# Patient Record
Sex: FEMALE | Race: AMERICAN INDIAN OR ALASKA NATIVE | NOT HISPANIC OR LATINO
[De-identification: names, ages, dates, MRNs, and addresses within clinical notes are randomized per-mention and may not be internally consistent; named-entity substitution may affect disease eponyms.]

---

## 2018-11-30 PROBLEM — Z00.00 ENCOUNTER FOR PREVENTIVE HEALTH EXAMINATION: Status: ACTIVE | Noted: 2018-11-30

## 2019-01-17 ENCOUNTER — APPOINTMENT (OUTPATIENT)
Dept: NEUROLOGY | Facility: CLINIC | Age: 49
End: 2019-01-17
Payer: COMMERCIAL

## 2019-01-17 VITALS
OXYGEN SATURATION: 97 % | TEMPERATURE: 98.2 F | BODY MASS INDEX: 25.52 KG/M2 | HEIGHT: 60 IN | WEIGHT: 130 LBS | HEART RATE: 76 BPM | SYSTOLIC BLOOD PRESSURE: 114 MMHG | DIASTOLIC BLOOD PRESSURE: 72 MMHG

## 2019-01-17 DIAGNOSIS — Z78.9 OTHER SPECIFIED HEALTH STATUS: ICD-10-CM

## 2019-01-17 PROCEDURE — 99244 OFF/OP CNSLTJ NEW/EST MOD 40: CPT

## 2019-01-17 RX ORDER — MECLIZINE HYDROCHLORIDE 25 MG/1
25 TABLET ORAL
Qty: 180 | Refills: 5 | Status: ACTIVE | COMMUNITY
Start: 2019-01-17 | End: 1900-01-01

## 2019-01-17 RX ORDER — IBUPROFEN 600 MG/1
600 TABLET, FILM COATED ORAL
Refills: 0 | Status: ACTIVE | COMMUNITY

## 2019-01-17 NOTE — HISTORY OF PRESENT ILLNESS
[FreeTextEntry1] : The patient has history of migraines since her 20s and is here for evaluation of headaches as well as vertigo. The patient has right-sided as well as posterior cervical pain described as pressure as well as throbbing, associated with photo and phonophobia as well as nausea and vomiting. The headaches occur one to twice per week, increased frequency since prior. There is no unilateral weakness or sensory loss with headaches. The patient's menses is regular.  The patient has not been preventative medications. \par \par Additionally, the patient has history of motion sickness and in his experiencing episodes of vertigo. The vertigo is brought up by spinning while dancing. This is associated dizziness. The patient has occasional double vision, horizontal, she also has blurry vision. There is no difficulty with speaking or swallowing. No balance problems.  No unilateral weakness or sensory loss.  No hearing loss or tinnitus.  The patient has history of frequent infections.\par Weakness or sensory loss.

## 2019-01-17 NOTE — PHYSICAL EXAM
[General Appearance - Alert] : alert [General Appearance - In No Acute Distress] : in no acute distress [Person] : oriented to person [Place] : oriented to place [Time] : oriented to time [Registration Intact] : recent registration memory intact [Concentration Intact] : normal concentrating ability [Visual Intact] : visual attention was ~T not ~L decreased [Naming Objects] : no difficulty naming common objects [Repeating Phrases] : no difficulty repeating a phrase [Fluency] : fluency intact [Comprehension] : comprehension intact [Vocabulary] : adequate range of vocabulary [Cranial Nerves Optic (II)] : visual acuity intact bilaterally,  visual fields full to confrontation, pupils equal round and reactive to light [Cranial Nerves Oculomotor (III)] : extraocular motion intact [Cranial Nerves Trigeminal (V)] : facial sensation intact symmetrically [Cranial Nerves Facial (VII)] : face symmetrical [Cranial Nerves Vestibulocochlear (VIII)] : hearing was intact bilaterally [Cranial Nerves Glossopharyngeal (IX)] : tongue and palate midline [Cranial Nerves Accessory (XI - Cranial And Spinal)] : head turning and shoulder shrug symmetric [Cranial Nerves Hypoglossal (XII)] : there was no tongue deviation with protrusion [Motor Tone] : muscle tone was normal in all four extremities [Motor Strength] : muscle strength was normal in all four extremities [Involuntary Movements] : no involuntary movements were seen [Sensation Tactile Decrease] : light touch was intact [Abnormal Walk] : normal gait [Balance] : balance was intact [1+] : Ankle jerk left 1+ [Full Pulse] : the pedal pulses are present [Coordination - Dysmetria Impaired Finger-to-Nose Bilateral] : not present [Coordination - Dysmetria Impaired Heel-to-Shin Bilateral] : not present [Plantar Reflex Right Only] : normal on the right [Plantar Reflex Left Only] : normal on the left [FreeTextEntry5] : Fundi sharp

## 2019-01-17 NOTE — CONSULT LETTER
[Dear  ___] : Dear  [unfilled], [Consult Letter:] : I had the pleasure of evaluating your patient, [unfilled]. [Please see my note below.] : Please see my note below. [Consult Closing:] : Thank you very much for allowing me to participate in the care of this patient.  If you have any questions, please do not hesitate to contact me. [Sincerely,] : Sincerely, [FreeTextEntry3] : Autumn Delarosa MD, MPH\par

## 2019-01-17 NOTE — REVIEW OF SYSTEMS
[As Noted in HPI] : as noted in HPI [Eyesight Problems] : eyesight problems [Vomiting] : vomiting [Fever] : no fever [Depression] : no depression [Loss Of Hearing] : no hearing loss [Chest Pain] : no chest pain [Shortness Of Breath] : no shortness of breath [Incontinence] : no incontinence [Joint Pain] : no joint pain [Itching] : no itching [Muscle Weakness] : no muscle weakness [Easy Bleeding] : no tendency for easy bleeding

## 2019-01-17 NOTE — ASSESSMENT
[FreeTextEntry1] : Migraines \par Will obtain MRI of the brain and EKG. Will consider starting a TAC for headache prevention at next visit.\par \par Vertigo, central vs. peripheral \par MRI brain r/o stroke or mass\par meclazine prn\par ENT referal

## 2019-01-26 ENCOUNTER — OUTPATIENT (OUTPATIENT)
Dept: OUTPATIENT SERVICES | Facility: HOSPITAL | Age: 49
LOS: 1 days | End: 2019-01-26
Payer: COMMERCIAL

## 2019-01-26 ENCOUNTER — APPOINTMENT (OUTPATIENT)
Dept: MRI IMAGING | Facility: HOSPITAL | Age: 49
End: 2019-01-26
Payer: COMMERCIAL

## 2019-01-26 DIAGNOSIS — R42 DIZZINESS AND GIDDINESS: ICD-10-CM

## 2019-01-26 DIAGNOSIS — G43.909 MIGRAINE, UNSPECIFIED, NOT INTRACTABLE, WITHOUT STATUS MIGRAINOSUS: ICD-10-CM

## 2019-01-26 PROCEDURE — 70551 MRI BRAIN STEM W/O DYE: CPT

## 2019-01-26 PROCEDURE — 70551 MRI BRAIN STEM W/O DYE: CPT | Mod: 26

## 2019-03-01 ENCOUNTER — APPOINTMENT (OUTPATIENT)
Dept: NEUROLOGY | Facility: CLINIC | Age: 49
End: 2019-03-01
Payer: COMMERCIAL

## 2019-03-01 VITALS
OXYGEN SATURATION: 99 % | TEMPERATURE: 98.1 F | HEIGHT: 60 IN | WEIGHT: 139 LBS | HEART RATE: 70 BPM | BODY MASS INDEX: 27.29 KG/M2

## 2019-03-01 VITALS — DIASTOLIC BLOOD PRESSURE: 54 MMHG | SYSTOLIC BLOOD PRESSURE: 95 MMHG

## 2019-03-01 DIAGNOSIS — M54.2 CERVICALGIA: ICD-10-CM

## 2019-03-01 DIAGNOSIS — G43.909 MIGRAINE, UNSPECIFIED, NOT INTRACTABLE, W/OUT STATUS MIGRAINOSUS: ICD-10-CM

## 2019-03-01 DIAGNOSIS — R42 DIZZINESS AND GIDDINESS: ICD-10-CM

## 2019-03-01 DIAGNOSIS — M54.10 RADICULOPATHY, SITE UNSPECIFIED: ICD-10-CM

## 2019-03-01 PROCEDURE — 99214 OFFICE O/P EST MOD 30 MIN: CPT

## 2019-03-01 NOTE — DATA REVIEWED
[de-identified] : INTERPRETATION: \par \par REASON FOR EXAM: 48-year-old headaches for few years . \par \par TECHNIQUE: Standardized multiplanar fat and water weighted pulse sequences \par were obtained without administration of contrast. \par \par COMPARISON: None. \par \par FINDINGS: \par There is normal size, configuration and morphology of the ventricles and \par cortical sulci. The midline structures are intact. \par \par There is no diffusion abnormality to suggest an acute infarction or other \par causes of cytotoxic edema. \par \par There is normal gray-white differentiation. \par \par There is normal configuration of cerebellar, mid brain, jerome and medulla \par without tonsillar ectopia. \par \par There is no acute hemorrhage or hydrocephalus. No extra-axial collections \par are identified. \par \par There is normal sella / parasellar structures, tectum and pineal region. \par \par The major intra-arterial flow voids are patent. \par \par The visualized paranasal sinuses are clear. \par \par The visualized portions of the nasopharynx, mastoids and upper cervical \par spine are grossly unremarkable. \par \par IMPRESSION: \par \par No space-occupying lesion or acute infarct.

## 2019-03-01 NOTE — PHYSICAL EXAM
[General Appearance - Alert] : alert [General Appearance - In No Acute Distress] : in no acute distress [Person] : oriented to person [Place] : oriented to place [Time] : oriented to time [Concentration Intact] : normal concentrating ability [Naming Objects] : no difficulty naming common objects [Fluency] : fluency intact [Comprehension] : comprehension intact [Vocabulary] : adequate range of vocabulary [Cranial Nerves Optic (II)] : visual acuity intact bilaterally,  visual fields full to confrontation, pupils equal round and reactive to light [Cranial Nerves Oculomotor (III)] : extraocular motion intact [Cranial Nerves Trigeminal (V)] : facial sensation intact symmetrically [Cranial Nerves Facial (VII)] : face symmetrical [Motor Tone] : muscle tone was normal in all four extremities [Motor Strength] : muscle strength was normal in all four extremities [Sensation Tactile Decrease] : light touch was intact [Abnormal Walk] : normal gait [Balance] : balance was intact

## 2019-03-01 NOTE — ASSESSMENT
[FreeTextEntry1] : Migraines, well controlled\par \par Tension headaches\par patient advised to get more rest and better nurishment\par warm towel to neck, massage or hot shower when needed\par Tylenol prn\par \par Vertigo, peripheral \par meclazine prn, advsied to breat 25mg tabs to half (12.5mg) prn as feels sleepy on 25mg dose\par \par Chronic neck and low back pain with radiation to the right arm and leg\par ncs/emg arms and legs\par Xray C and LS spine\par PT\par patient not ready for medications yet but willing reconsider them after PT\par \par fu emgs\par

## 2019-03-01 NOTE — HISTORY OF PRESENT ILLNESS
[FreeTextEntry1] : The patient has history of migraines since her 20s and is here for evaluation of headaches as well as vertigo. The patient has right-sided as well as posterior cervical pain described as pressure as well as throbbing, associated with photo and phonophobia as well as nausea and vomiting. The headaches were occurring one to twice per week. There is no unilateral weakness or sensory loss with headaches. The patient's menses is regular. The migraines improved and she has not had any interval migraines.  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  Headache present when she has long days at school, she also does not have good rest and nutrition those days. \par \par Additionally, the patient has history of motion sickness and in his experiencing episodes of vertigo. The vertigo is brought up by spinning while dancing. This is associated dizziness. The patient has occasional double vision, horizontal, she also has blurry vision. There is no difficulty with speaking or swallowing. No balance problems. No unilateral weakness or sensory loss. No hearing loss or tinnitus. The patient was given meclazine and vertigo has improved.  She feels sleepy with Meclazine 25mg.\par \par The patient also has chronic neck and lower back pain radiating to the right arm and leg.  No weakness.  She has not had any work up or treatment for this symptoms.  No bowel or bladder incontinence or saddle anesthesia.\par

## 2019-04-24 ENCOUNTER — APPOINTMENT (OUTPATIENT)
Dept: NEUROLOGY | Facility: CLINIC | Age: 49
End: 2019-04-24